# Patient Record
Sex: MALE | Race: WHITE | NOT HISPANIC OR LATINO | Employment: FULL TIME | URBAN - METROPOLITAN AREA
[De-identification: names, ages, dates, MRNs, and addresses within clinical notes are randomized per-mention and may not be internally consistent; named-entity substitution may affect disease eponyms.]

---

## 2018-10-09 ENCOUNTER — HOSPITAL ENCOUNTER (EMERGENCY)
Facility: CLINIC | Age: 23
Discharge: HOME | End: 2018-10-09
Attending: EMERGENCY MEDICINE | Admitting: EMERGENCY MEDICINE
Payer: COMMERCIAL

## 2018-10-09 VITALS
TEMPERATURE: 98 F | DIASTOLIC BLOOD PRESSURE: 68 MMHG | BODY MASS INDEX: 25.67 KG/M2 | WEIGHT: 200 LBS | HEIGHT: 74 IN | RESPIRATION RATE: 18 BRPM | SYSTOLIC BLOOD PRESSURE: 131 MMHG | HEART RATE: 89 BPM | OXYGEN SATURATION: 97 %

## 2018-10-09 DIAGNOSIS — R10.30 LOWER ABDOMINAL PAIN: ICD-10-CM

## 2018-10-09 LAB
ALBUMIN SERPL-MCNC: 3.3 G/DL (ref 3.4–5)
ALBUMIN UR-MCNC: NEGATIVE MG/DL
ALP SERPL-CCNC: 84 U/L (ref 40–150)
ALT SERPL W P-5'-P-CCNC: 59 U/L (ref 0–70)
ANION GAP SERPL CALCULATED.3IONS-SCNC: 6 MMOL/L (ref 3–14)
APPEARANCE UR: CLEAR
AST SERPL W P-5'-P-CCNC: 40 U/L (ref 0–45)
BASOPHILS # BLD AUTO: 0 10E9/L (ref 0–0.2)
BASOPHILS NFR BLD AUTO: 0.4 %
BILIRUB SERPL-MCNC: 0.3 MG/DL (ref 0.2–1.3)
BILIRUB UR QL STRIP: NEGATIVE
BUN SERPL-MCNC: 13 MG/DL (ref 7–30)
CALCIUM SERPL-MCNC: 8.5 MG/DL (ref 8.5–10.1)
CHLORIDE SERPL-SCNC: 96 MMOL/L (ref 94–109)
CO2 SERPL-SCNC: 32 MMOL/L (ref 20–32)
COLOR UR AUTO: YELLOW
CREAT SERPL-MCNC: 0.8 MG/DL (ref 0.66–1.25)
DIFFERENTIAL METHOD BLD: NORMAL
EOSINOPHIL # BLD AUTO: 0.2 10E9/L (ref 0–0.7)
EOSINOPHIL NFR BLD AUTO: 2.5 %
ERYTHROCYTE [DISTWIDTH] IN BLOOD BY AUTOMATED COUNT: 12.7 % (ref 10–15)
GFR SERPL CREATININE-BSD FRML MDRD: >90 ML/MIN/1.7M2
GLUCOSE BLDC GLUCOMTR-MCNC: 109 MG/DL (ref 70–99)
GLUCOSE SERPL-MCNC: 402 MG/DL (ref 70–99)
GLUCOSE UR STRIP-MCNC: NEGATIVE MG/DL
HBA1C MFR BLD: 5.4 % (ref 0–5.6)
HCT VFR BLD AUTO: 46 % (ref 40–53)
HGB BLD-MCNC: 15.3 G/DL (ref 13.3–17.7)
HGB UR QL STRIP: NEGATIVE
IMM GRANULOCYTES # BLD: 0 10E9/L (ref 0–0.4)
IMM GRANULOCYTES NFR BLD: 0.1 %
KETONES UR STRIP-MCNC: NEGATIVE MG/DL
LEUKOCYTE ESTERASE UR QL STRIP: NEGATIVE
LYMPHOCYTES # BLD AUTO: 3.1 10E9/L (ref 0.8–5.3)
LYMPHOCYTES NFR BLD AUTO: 38.3 %
MCH RBC QN AUTO: 28.5 PG (ref 26.5–33)
MCHC RBC AUTO-ENTMCNC: 33.3 G/DL (ref 31.5–36.5)
MCV RBC AUTO: 86 FL (ref 78–100)
MONOCYTES # BLD AUTO: 0.6 10E9/L (ref 0–1.3)
MONOCYTES NFR BLD AUTO: 7.1 %
NEUTROPHILS # BLD AUTO: 4.2 10E9/L (ref 1.6–8.3)
NEUTROPHILS NFR BLD AUTO: 51.6 %
NITRATE UR QL: NEGATIVE
NRBC # BLD AUTO: 0 10*3/UL
NRBC BLD AUTO-RTO: 0 /100
PH UR STRIP: 6 PH (ref 5–7)
PLATELET # BLD AUTO: 208 10E9/L (ref 150–450)
POTASSIUM SERPL-SCNC: 3.2 MMOL/L (ref 3.4–5.3)
PROT SERPL-MCNC: 7.2 G/DL (ref 6.8–8.8)
RBC # BLD AUTO: 5.37 10E12/L (ref 4.4–5.9)
SODIUM SERPL-SCNC: 134 MMOL/L (ref 133–144)
SOURCE: NORMAL
SP GR UR STRIP: 1.02 (ref 1–1.03)
UROBILINOGEN UR STRIP-MCNC: 0 MG/DL (ref 0–2)
WBC # BLD AUTO: 8.1 10E9/L (ref 4–11)

## 2018-10-09 PROCEDURE — 85025 COMPLETE CBC W/AUTO DIFF WBC: CPT | Performed by: EMERGENCY MEDICINE

## 2018-10-09 PROCEDURE — 80053 COMPREHEN METABOLIC PANEL: CPT | Performed by: EMERGENCY MEDICINE

## 2018-10-09 PROCEDURE — 99283 EMERGENCY DEPT VISIT LOW MDM: CPT | Performed by: EMERGENCY MEDICINE

## 2018-10-09 PROCEDURE — 81003 URINALYSIS AUTO W/O SCOPE: CPT | Performed by: EMERGENCY MEDICINE

## 2018-10-09 PROCEDURE — 00000146 ZZHCL STATISTIC GLUCOSE BY METER IP

## 2018-10-09 PROCEDURE — 83036 HEMOGLOBIN GLYCOSYLATED A1C: CPT | Performed by: EMERGENCY MEDICINE

## 2018-10-09 PROCEDURE — 99284 EMERGENCY DEPT VISIT MOD MDM: CPT | Mod: Z6 | Performed by: EMERGENCY MEDICINE

## 2018-10-09 NOTE — ED AVS SNAPSHOT
[facility] Emergency Department    [address] [address]    Phone:  [phone]    Fax:  [phone]                                       Filippo Solomon   MRN: [id number]    Department:  [facility] Emergency Department   Date of Visit:  10/9/2018           Patient Information     Date Of Birth          1995        Your diagnoses for this visit were:     Lower abdominal pain        You were seen by Yogesh Manjarrez MD.      Follow-up Information     Follow up with No Ref-Primary, Physician.        Discharge Instructions         * Abdominal Pain,Uncertain Cause [Male]  Based on your visit today, the exact cause of your abdominal (stomach) pain is not clear. Your exam and tests do not indicate a dangerous cause at this time. However, the signs of a serious problem may take more time to appear. Although your exam was reassuring today, sometimes early in the course of many conditions, exam and lab tests can appear normal. Therefore, it is important for you to watch for any new symptoms or worsening of your condition.  Causes:  It may not be obvious what caused your symptoms. Pay attention to things that do seem to make your symptoms worse or better and discuss this with your doctor when you follow up.  Diagnosis:  The evaluation of abdominal pain in the emergency department may only require an exam by the doctor or it may include blood, urine or imaging studies, depending on many factors. Sometimes exams and tests can identify a cause but in many cases, a clear cause is not found. Further testing at follow up visits may help to suggest a clear diagnosis.  Home Care:    Rest as much as possible until your next exam.    Try to avoid any medications (unless otherwise directed by your doctor), foods, activities, or other factors that you may have contributed to your symptoms.    Try to eat foods that you know that you have tolerated well in the past. Certain diets may be recommended for  some conditions that cause abdominal pain. However, since the cause of your symptoms may not be clear, discuss your diet more with your primary care provider or specialist for further recommendations.     Eating several small meals per day as opposed to 2 or 3 larger meals may help.    Monitor closely for anything that may make your symptoms worse or better. Pay close attention to symptoms below that may indicate worsening of your condition.  Follow Up And Precautions:  See your doctor or this facility as instructed (or sooner, if your symptoms are not improving). In some cases, you may need more testing.  Contact Your Doctor Or Seek Medical Attention  if any of the following occur:    Pain is becoming worse    You are unable to take your medications because of too much vomiting    Swelling of the abdomen    Fever of 101 F (38.3 C) or higher, or as directed by your health care provider    Blood in vomit or bowel movements (dark red or black color)    Jaundice (yellow color of eyes and skin)    New onset of weakness, dizziness or fainting    New onset of chest, arm, back, neck or jaw pain    9403-2760 [employer]. [address] [place] [address]. All rights reserved. This information is not intended as a substitute for professional medical care. Always follow your healthcare professional's instructions.  This information has been modified by your health care provider with permission from the publisher.          24 Hour Appointment Hotline       To make an appointment at any [facility] call [phone] ([phone]). If you don't have a family doctor or clinic, we will help you find one. [facility] clinics are conveniently located to serve the needs of you and your family.             Review of your medicines      Notice     You have not been prescribed any medications.            Procedures and tests performed during your visit     CBC with platelets differential    Comprehensive  metabolic panel    Glucose Monitoring Nursing    Glucose by meter    Hemaglobin A1C    UA reflex to Microscopic      Orders Needing Specimen Collection     None      Pending Results     No orders found from 10/7/2018 to 10/10/2018.            Pending Culture Results     No orders found from 10/7/2018 to 10/10/2018.            Pending Results Instructions     If you had any lab results that were not finalized at the time of your Discharge, you can call the ED Lab Result RN at [phone]. You will be contacted by this team for any positive Lab results or changes in treatment. The nurses are available 7 days a week from 10A to 6:30P.  You can leave a message 24 hours per day and they will return your call.        Test Results From Your Hospital Stay        10/9/2018  8:55 PM      Component Results     Component Value Ref Range & Units Status    WBC 8.1 4.0 - 11.0 10e9/L Final    RBC Count 5.37 4.4 - 5.9 10e12/L Final    Hemoglobin 15.3 13.3 - 17.7 g/dL Final    Hematocrit 46.0 40.0 - 53.0 % Final    MCV 86 78 - 100 fl Final    MCH 28.5 26.5 - 33.0 pg Final    MCHC 33.3 31.5 - 36.5 g/dL Final    RDW 12.7 10.0 - 15.0 % Final    Platelet Count 208 150 - 450 10e9/L Final    Diff Method Automated Method  Final    % Neutrophils 51.6 % Final    % Lymphocytes 38.3 % Final    % Monocytes 7.1 % Final    % Eosinophils 2.5 % Final    % Basophils 0.4 % Final    % Immature Granulocytes 0.1 % Final    Nucleated RBCs 0 0 /100 Final    Absolute Neutrophil 4.2 1.6 - 8.3 10e9/L Final    Absolute Lymphocytes 3.1 0.8 - 5.3 10e9/L Final    Absolute Monocytes 0.6 0.0 - 1.3 10e9/L Final    Absolute Eosinophils 0.2 0.0 - 0.7 10e9/L Final    Absolute Basophils 0.0 0.0 - 0.2 10e9/L Final    Abs Immature Granulocytes 0.0 0 - 0.4 10e9/L Final    Absolute Nucleated RBC 0.0  Final         10/9/2018  9:26 PM      Component Results     Component Value Ref Range & Units Status    Sodium 134 133 - 144 mmol/L Final    Potassium 3.2 (L) 3.4 - 5.3 mmol/L  Final    Chloride 96 94 - 109 mmol/L Final    Carbon Dioxide 32 20 - 32 mmol/L Final    Anion Gap 6 3 - 14 mmol/L Final    Glucose 402 (H) 70 - 99 mg/dL Final    Urea Nitrogen 13 7 - 30 mg/dL Final    Creatinine 0.80 0.66 - 1.25 mg/dL Final    GFR Estimate >90 >60 mL/min/1.7m2 Final    Non  GFR Calc    GFR Estimate If Black >90 >60 mL/min/1.7m2 Final    African American GFR Calc    Calcium 8.5 8.5 - 10.1 mg/dL Final    Bilirubin Total 0.3 0.2 - 1.3 mg/dL Final    Albumin 3.3 (L) 3.4 - 5.0 g/dL Final    Protein Total 7.2 6.8 - 8.8 g/dL Final    Alkaline Phosphatase 84 40 - 150 U/L Final    ALT 59 0 - 70 U/L Final    AST 40 0 - 45 U/L Final         10/9/2018  8:59 PM      Component Results     Component Value Ref Range & Units Status    Color Urine Yellow  Final    Appearance Urine Clear  Final    Glucose Urine Negative NEG^Negative mg/dL Final    Bilirubin Urine Negative NEG^Negative Final    Ketones Urine Negative NEG^Negative mg/dL Final    Specific Gravity Urine 1.023 1.003 - 1.035 Final    Blood Urine Negative NEG^Negative Final    pH Urine 6.0 5.0 - 7.0 pH Final    Protein Albumin Urine Negative NEG^Negative mg/dL Final    Urobilinogen mg/dL 0.0 0.0 - 2.0 mg/dL Final    Nitrite Urine Negative NEG^Negative Final    Leukocyte Esterase Urine Negative NEG^Negative Final    Source Midstream Urine  Final         10/9/2018 10:16 PM      Component Results     Component Value Ref Range & Units Status    Hemoglobin A1C 5.4 0 - 5.6 % Final    Normal <5.7% Prediabetes 5.7-6.4%  Diabetes 6.5% or higher - adopted from ADA   consensus guidelines.           10/9/2018  9:50 PM      Component Results     Component Value Ref Range & Units Status    Glucose 109 (H) 70 - 99 mg/dL Final                Thank you for choosing [facility]       Thank you for choosing [facility] for your care. Our goal is always to provide you with excellent care. Hearing back from our patients is one way we can continue to improve our  "services. Please take a few minutes to complete the written survey that you may receive in the mail after you visit with us. Thank you!        [facility] medical Information     [employer] lets you send messages to your doctor, view your test results, renew your prescriptions, schedule appointments and more. To sign up, go to [email or web address] . Click on \"Log in\" on the left side of the screen, which will take you to the Welcome page. Then click on \"Sign up Now\" on the right side of the page.     You will be asked to enter the access code listed below, as well as some personal information. Please follow the directions to create your username and password.     Your access code is: [email or web address]  Expires: 2019 10:23 PM     Your access code will  in 90 days. If you need help or a new code, please call your [facility] clinic or [phone].        Care EveryWhere ID     This is your Care EveryWhere ID. This could be used by other organizations to access your [facility] medical records  [id number]        Equal Access to Services     [facility] HospitalVERONICA : Hadii cole Mancuso, waasherda jeferson, qaybmartin hamilton, robert camilo. So [facility] [phone].    ATENCIÓN: Si habla español, tiene a stubbs disposición servicios gratuitos de asistencia lingüística. Llame al [phone].    We comply with applicable federal civil rights laws and [place] laws. We do not discriminate on the basis of race, color, national origin, age, disability, sex, sexual orientation, or gender identity.            After Visit Summary       This is your record. Keep this with you and show to your community pharmacist(s) and doctor(s) at your next visit.                  "

## 2018-10-09 NOTE — ED AVS SNAPSHOT
[facility] Emergency Department    [address] [facility] [address]    Phone:  [phone]    Fax:  [phone]                                       Filippo Solomon   MRN: [id number]    Department:  [facility] Emergency Department   Date of Visit:  10/9/2018           After Visit Summary Signature Page     I have received my discharge instructions, and my questions have been answered. I have discussed any challenges I see with this plan with the nurse or doctor.    ..........................................................................................................................................  Patient/Patient Representative Signature      ..........................................................................................................................................  Patient Representative Print Name and Relationship to Patient    ..................................................               ................................................  Date                                   Time    ..........................................................................................................................................  Reviewed by Signature/Title    ...................................................              ..............................................  Date                                               Time          22EPIC Rev 08/18

## 2018-10-10 NOTE — DISCHARGE INSTRUCTIONS
* Abdominal Pain,Uncertain Cause [Male]  Based on your visit today, the exact cause of your abdominal (stomach) pain is not clear. Your exam and tests do not indicate a dangerous cause at this time. However, the signs of a serious problem may take more time to appear. Although your exam was reassuring today, sometimes early in the course of many conditions, exam and lab tests can appear normal. Therefore, it is important for you to watch for any new symptoms or worsening of your condition.  Causes:  It may not be obvious what caused your symptoms. Pay attention to things that do seem to make your symptoms worse or better and discuss this with your doctor when you follow up.  Diagnosis:  The evaluation of abdominal pain in the emergency department may only require an exam by the doctor or it may include blood, urine or imaging studies, depending on many factors. Sometimes exams and tests can identify a cause but in many cases, a clear cause is not found. Further testing at follow up visits may help to suggest a clear diagnosis.  Home Care:    Rest as much as possible until your next exam.    Try to avoid any medications (unless otherwise directed by your doctor), foods, activities, or other factors that you may have contributed to your symptoms.    Try to eat foods that you know that you have tolerated well in the past. Certain diets may be recommended for some conditions that cause abdominal pain. However, since the cause of your symptoms may not be clear, discuss your diet more with your primary care provider or specialist for further recommendations.     Eating several small meals per day as opposed to 2 or 3 larger meals may help.    Monitor closely for anything that may make your symptoms worse or better. Pay close attention to symptoms below that may indicate worsening of your condition.  Follow Up And Precautions:  See your doctor or this facility as instructed (or sooner, if your symptoms are not  improving). In some cases, you may need more testing.  Contact Your Doctor Or Seek Medical Attention  if any of the following occur:    Pain is becoming worse    You are unable to take your medications because of too much vomiting    Swelling of the abdomen    Fever of 101 F (38.3 C) or higher, or as directed by your health care provider    Blood in vomit or bowel movements (dark red or black color)    Jaundice (yellow color of eyes and skin)    New onset of weakness, dizziness or fainting    New onset of chest, arm, back, neck or jaw pain    3895-9078 [employer]. [address] [place] [address]. All rights reserved. This information is not intended as a substitute for professional medical care. Always follow your healthcare professional's instructions.  This information has been modified by your health care provider with permission from the publisher.

## 2018-10-10 NOTE — ED NOTES
Pt presented accompanied by his fiance, c/o swelling in R testicle.     Pt stated he had felt chills last night and was concerned about epididymitis after researching symptoms online.     Pt was A/O x4 on arrival, skin was normal/ warm/ dry, radial pulse was strong/ reg, respirations were non-labored.     Pt denied DYSURIA/ HEMATURIA, denied painful intercourse.

## 2018-10-10 NOTE — ED PROVIDER NOTES
"  History     Chief Complaint   Patient presents with     Groin Swelling     started last noc with groining pain, and low abd pain x 3 days, thought he had a UTI      HPI  Filippo Solomon is a 23 year old male who presents complaining of lower abdominal pain that began about a week ago without inciting trauma.  Described as a bloating sensation mid lower abdomen waxing and waning.  Last night he developed right testicular pain and tenderness lasting about 30 minutes, he had some associated chills.  He said urinary frequency without dysuria or gross hematuria.  He denies any penile discharge.  He has had no nausea or vomiting.  He had 2-3 episodes of watery diarrhea earlier today without bloody component.  He denies ill contacts.  No chronic medications, is non-smoker, occasional alcohol denies illicit drug use.  His father's had kidney stone.  No prior abdominal surgery.    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  He is strapped into a cage with multiple seatbelt harness is, and believes this may be why he is having some lower abdominal discomfort.    Problem List:    There are no active problems to display for this patient.       Past Medical History:    No past medical history on file.    Past Surgical History:    No past surgical history on file.    Family History:    No family history on file.    Social History:  Marital Status:  Single [1]  Social History   Substance Use Topics     Smoking status: Not on file     Smokeless tobacco: Not on file     Alcohol use Not on file        Medications:      No current outpatient prescriptions on file.      Review of Systems  All other systems reviewed and are negative.    Physical Exam   BP: 148/84  Pulse: 89  Heart Rate: 88  Temp: 98  F (36.7  C)  Resp: 16  Height: 188 cm (6' 2\")  Weight: " 90.7 kg (200 lb)  SpO2: 100 %      Physical Exam  Nontoxic appearing no respiratory distress alert and oriented ×3  Head atraumatic normocephalic  Conjunctiva noninjected, oropharynx moist without lesions or erythema  No cervical adenopathy   Neck supple full active painless range of motion  Lungs clear to auscultation  Heart regular no murmur  Abdomen soft nontender bowel sounds positive no masses or HSM  External genitalia normally developed, testes descended bilaterally, cords and testes are unremarkable and nontender, there is no associated swelling, no testicular mass appreciated.  He has a small indirect right inguinal canal hernia.  Strength and sensation grossly intact throughout the extremities, gait and station normal  Speech is fluent, good eye contact, thought processes are rational      ED Course     ED Course     Procedures               Critical Care time:  none     Results for orders placed or performed during the hospital encounter of 10/09/18   CBC with platelets differential   Result Value Ref Range    WBC 8.1 4.0 - 11.0 10e9/L    RBC Count 5.37 4.4 - 5.9 10e12/L    Hemoglobin 15.3 13.3 - 17.7 g/dL    Hematocrit 46.0 40.0 - 53.0 %    MCV 86 78 - 100 fl    MCH 28.5 26.5 - 33.0 pg    MCHC 33.3 31.5 - 36.5 g/dL    RDW 12.7 10.0 - 15.0 %    Platelet Count 208 150 - 450 10e9/L    Diff Method Automated Method     % Neutrophils 51.6 %    % Lymphocytes 38.3 %    % Monocytes 7.1 %    % Eosinophils 2.5 %    % Basophils 0.4 %    % Immature Granulocytes 0.1 %    Nucleated RBCs 0 0 /100    Absolute Neutrophil 4.2 1.6 - 8.3 10e9/L    Absolute Lymphocytes 3.1 0.8 - 5.3 10e9/L    Absolute Monocytes 0.6 0.0 - 1.3 10e9/L    Absolute Eosinophils 0.2 0.0 - 0.7 10e9/L    Absolute Basophils 0.0 0.0 - 0.2 10e9/L    Abs Immature Granulocytes 0.0 0 - 0.4 10e9/L    Absolute Nucleated RBC 0.0    Comprehensive metabolic panel   Result Value Ref Range    Sodium 134 133 - 144 mmol/L    Potassium 3.2 (L) 3.4 - 5.3 mmol/L     Chloride 96 94 - 109 mmol/L    Carbon Dioxide 32 20 - 32 mmol/L    Anion Gap 6 3 - 14 mmol/L    Glucose 402 (H) 70 - 99 mg/dL    Urea Nitrogen 13 7 - 30 mg/dL    Creatinine 0.80 0.66 - 1.25 mg/dL    GFR Estimate >90 >60 mL/min/1.7m2    GFR Estimate If Black >90 >60 mL/min/1.7m2    Calcium 8.5 8.5 - 10.1 mg/dL    Bilirubin Total 0.3 0.2 - 1.3 mg/dL    Albumin 3.3 (L) 3.4 - 5.0 g/dL    Protein Total 7.2 6.8 - 8.8 g/dL    Alkaline Phosphatase 84 40 - 150 U/L    ALT 59 0 - 70 U/L    AST 40 0 - 45 U/L   UA reflex to Microscopic   Result Value Ref Range    Color Urine Yellow     Appearance Urine Clear     Glucose Urine Negative NEG^Negative mg/dL    Bilirubin Urine Negative NEG^Negative    Ketones Urine Negative NEG^Negative mg/dL    Specific Gravity Urine 1.023 1.003 - 1.035    Blood Urine Negative NEG^Negative    pH Urine 6.0 5.0 - 7.0 pH    Protein Albumin Urine Negative NEG^Negative mg/dL    Urobilinogen mg/dL 0.0 0.0 - 2.0 mg/dL    Nitrite Urine Negative NEG^Negative    Leukocyte Esterase Urine Negative NEG^Negative    Source Midstream Urine    Hemaglobin A1C   Result Value Ref Range    Hemoglobin A1C 5.4 0 - 5.6 %   Glucose by meter   Result Value Ref Range    Glucose 109 (H) 70 - 99 mg/dL                 No results found for this or any previous visit (from the past 24 hour(s)).    Medications - No data to display    Assessments & Plan (with Medical Decision Making)  Midline lower abdominal pain, exam unremarkable, workup significant only for confounding aberrant serum blood sugar of 400, hemoglobin A1c 5.4 and bedside blood sugar 109, urine negative for glucosuria.  Findings likely secondary to lab error.  With respect to abdominal pain, no evidence for usual differential.  May well be abdominal wall strain secondary to drag racing.  Recommend watchful waiting.  Sabas return criteria, he and his wife expressed understanding and agreement.     I have reviewed the nursing notes.    I have reviewed the findings,  diagnosis, plan and need for follow up with the patient.       There are no discharge medications for this patient.      Final diagnoses:   Lower abdominal pain       10/9/2018   [facility] EMERGENCY DEPARTMENT     Yogesh Manjarrez MD  10/12/18 3953

## 2019-10-07 ENCOUNTER — OFFICE VISIT (OUTPATIENT)
Dept: FAMILY MEDICINE | Facility: CLINIC | Age: 24
End: 2019-10-07
Payer: COMMERCIAL

## 2019-10-07 VITALS
HEART RATE: 80 BPM | RESPIRATION RATE: 14 BRPM | HEIGHT: 73 IN | BODY MASS INDEX: 24.92 KG/M2 | SYSTOLIC BLOOD PRESSURE: 124 MMHG | DIASTOLIC BLOOD PRESSURE: 78 MMHG | TEMPERATURE: 97.8 F | WEIGHT: 188 LBS | OXYGEN SATURATION: 99 %

## 2019-10-07 DIAGNOSIS — Z28.21 VACCINATION REFUSED BY PATIENT: ICD-10-CM

## 2019-10-07 DIAGNOSIS — Z00.00 ROUTINE GENERAL MEDICAL EXAMINATION AT A HEALTH CARE FACILITY: Primary | ICD-10-CM

## 2019-10-07 DIAGNOSIS — Z23 NEED FOR VACCINATION: ICD-10-CM

## 2019-10-07 PROCEDURE — 99385 PREV VISIT NEW AGE 18-39: CPT | Mod: 25 | Performed by: FAMILY MEDICINE

## 2019-10-07 PROCEDURE — 90471 IMMUNIZATION ADMIN: CPT | Performed by: FAMILY MEDICINE

## 2019-10-07 PROCEDURE — 90714 TD VACC NO PRESV 7 YRS+ IM: CPT | Performed by: FAMILY MEDICINE

## 2019-10-07 ASSESSMENT — MIFFLIN-ST. JEOR: SCORE: 1887.67

## 2019-10-07 NOTE — PROGRESS NOTES
SUBJECTIVE:   CC: Filippo Solomon is an 24 year old male who presents for preventive health visit.     Healthy Habits:    Do you get at least three servings of calcium containing foods daily (dairy, green leafy vegetables, etc.)? yes    Amount of exercise or daily activities, outside of work: pt is active - jetskiing, snowmobiling, racing, yardwork.    Problems taking medications regularly Na    Medication side effects: Na    Have you had an eye exam in the past two years? yes    Do you see a dentist twice per year? No, appt next wk    Do you have sleep apnea, excessive snoring or daytime drowsiness? no      No other concerns    Today's PHQ-2 Score:   PHQ-2 ( 1999 [employer]) 10/7/2019   Q1: Little interest or pleasure in doing things 0   Q2: Feeling down, depressed or hopeless 0   PHQ-2 Score 0       Abuse: Current or Past(Physical, Sexual or Emotional)- No  Do you feel safe in your environment? Yes    Social History     Tobacco Use     Smoking status: Never Smoker     Smokeless tobacco: Never Used   Substance Use Topics     Alcohol use: Yes     Comment: social     If you drink alcohol do you typically have >3 drinks per day or >7 drinks per week? No                      Last PSA: No results found for: PSA    Reviewed orders with patient. Reviewed health maintenance and updated orders accordingly - Yes  There is no problem list on file for this patient.    History reviewed. No pertinent surgical history.    Social History     Tobacco Use     Smoking status: Never Smoker     Smokeless tobacco: Never Used   Substance Use Topics     Alcohol use: Yes     Comment: social     Family History   Problem Relation Age of Onset     Hypertension Mother      Diabetes Maternal Grandmother      Breast Cancer Maternal Grandmother      Kidney failure Paternal Grandfather      Breast Cancer Maternal Aunt          No current outpatient medications on file.     No Known Allergies    Reviewed and updated as needed this visit by clinical  "staff  Tobacco  Allergies  Meds  Med Hx  Surg Hx  Fam Hx  Soc Hx        Reviewed and updated as needed this visit by Provider        History reviewed. No pertinent past medical history.   History reviewed. No pertinent surgical history.    ROS:  C: NEGATIVE for fever, chills, or change in weight  I: NEGATIVE for worrisome rashes, moles or lesions  E: NEGATIVE for vision changes or irritation  ENT: NEGATIVE for ear, mouth and throat problems  R: NEGATIVE for significant cough or SOB  CV: NEGATIVE for chest pain, palpitations or peripheral edema  GI: NEGATIVE for nausea, abdominal pain, heartburn, or change in bowel habits  :negative for dysuria, hematuria, decreased urinary stream, or discharge  M: NEGATIVE for significant arthralgias or myalgia  N: NEGATIVE for weakness, dizziness or paresthesias  E: NEGATIVE for temperature intolerance, skin/hair changes  H: NEGATIVE for bleeding problems  P: NEGATIVE for changes in mood or affect    OBJECTIVE:   /78   Pulse 80   Temp 97.8  F (36.6  C) (Tympanic)   Resp 14   Ht 1.848 m (6' 0.75\")   Wt 85.3 kg (188 lb)   SpO2 99%   BMI 24.97 kg/m    EXAM:  GENERAL APPEARANCE: healthy, alert and no distress  EYES: pink conj, no icterus, PERRL, EOMI  HENT: ear canals and TM's normal, nose and mouth without ulcers or lesions, oropharynx clear and oral mucous membranes moist  NECK: no adenopathy, no asymmetry, masses, or scars and thyroid normal to palpation  RESP: lungs clear to auscultation - no rales, rhonchi or wheezes  CV: regular rates and rhythm, normal S1 S2, no S3 or S4, no murmur, click or rub, no peripheral edema and peripheral pulses strong  ABDOMEN: soft, nontender, no hepatosplenomegaly, no masses and bowel sounds normal  RECTAL: deferred  MS: no musculoskeletal defects are noted and gait is age appropriate without ataxia  SKIN: no suspicious lesions or rashes  NEURO: Normal strength and tone, sensory exam grossly normal, mentation intact and speech " "normal  PSYCH: appears anxious (he states he does not got o doctors a lot), appropriate affect    Diagnostic Test Results:  none     ASSESSMENT/PLAN:   Filippo was seen today for physical.    Diagnoses and all orders for this visit:    Routine general medical examination at a health care facility    Vaccination refused by patient  Offered flu vaccine.  Discussed benefits of getting it and risk of not getting it.  Patient still declined.    Need for vaccination  -     TD PRSERV FREE >=7 YRS ADS IM [21377]  -     1st  Administration  [[id number]]        COUNSELING:  Reviewed preventive health counseling, as reflected in patient instructions       Regular exercise       Healthy diet/nutrition       Vision screening       Hearing screening       Immunizations    Vaccinated for: Td and Declined: Influenza due to patient states he is deathly afraid of needles               Alcohol Use       Family planning       Safe sex practices/STD prevention       HIV screeninx in teen years, 1x in adult years, and at intervals if high risk       Osteoporosis Prevention/Bone Health    Estimated body mass index is 24.97 kg/m  as calculated from the following:    Height as of this encounter: 1.848 m (6' 0.75\").    Weight as of this encounter: 85.3 kg (188 lb).         reports that he has never smoked. He has never used smokeless tobacco.      Counseling Resources:  ATP IV Guidelines  Pooled Cohorts Equation Calculator  FRAX Risk Assessment  ICSI Preventive Guidelines  Dietary Guidelines for Americans, 2010  USDA's MyPlate  ASA Prophylaxis  Lung CA Screening    Yogesh Manzo MD  [facility]  "

## 2019-10-07 NOTE — PATIENT INSTRUCTIONS
Think about a flu shot this season.      Preventive Health Recommendations  Male Ages 21 - 25     Yearly exam:             See your health care provider every year in order to  o   Review health changes.   o   Discuss preventive care.    o   Review your medicines if your doctor has prescribed any.    You should be tested each year for STDs (sexually transmitted diseases).     Talk to your provider about cholesterol testing.      If you are at risk for diabetes, you should have a diabetes test (fasting glucose).    Shots: Get a flu shot each year. Get a tetanus shot every 10 years.     Nutrition:    Eat at least 5 servings of fruits and vegetables daily.     Eat whole-grain bread, whole-wheat pasta and brown rice instead of white grains and rice.     Get adequate calcium and Vitamin D.     Lifestyle    Exercise for at least 150 minutes a week (30 minutes a day, 5 days a week). This will help you control your weight and prevent disease.     Limit alcohol to one drink per day.     No smoking.     Wear sunscreen to prevent skin cancer.     See your dentist every six months for an exam and cleaning.

## 2022-04-06 ENCOUNTER — TRANSCRIBE ORDERS (OUTPATIENT)
Dept: OTHER | Age: 27
End: 2022-04-06

## 2022-04-06 DIAGNOSIS — S83.212D BUCKET-HANDLE TEAR OF MEDIAL MENISCUS OF LEFT KNEE AS CURRENT INJURY, SUBSEQUENT ENCOUNTER: Primary | ICD-10-CM

## 2022-04-26 ENCOUNTER — HOSPITAL ENCOUNTER (OUTPATIENT)
Dept: PHYSICAL THERAPY | Facility: CLINIC | Age: 27
Setting detail: THERAPIES SERIES
Discharge: HOME | End: 2022-04-26
Attending: ORTHOPAEDIC SURGERY
Payer: COMMERCIAL

## 2022-04-26 DIAGNOSIS — S83.212D BUCKET-HANDLE TEAR OF MEDIAL MENISCUS OF LEFT KNEE AS CURRENT INJURY, SUBSEQUENT ENCOUNTER: ICD-10-CM

## 2022-04-26 PROCEDURE — 97161 PT EVAL LOW COMPLEX 20 MIN: CPT | Mod: GP | Performed by: PHYSICAL THERAPIST

## 2022-04-26 PROCEDURE — 97110 THERAPEUTIC EXERCISES: CPT | Mod: GP | Performed by: PHYSICAL THERAPIST
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
## 2022-05-03 ENCOUNTER — HOSPITAL ENCOUNTER (OUTPATIENT)
Dept: PHYSICAL THERAPY | Facility: CLINIC | Age: 27
Setting detail: THERAPIES SERIES
Discharge: HOME | End: 2022-05-03
Attending: ORTHOPAEDIC SURGERY
Payer: COMMERCIAL

## 2022-05-03 PROCEDURE — 97110 THERAPEUTIC EXERCISES: CPT | Mod: GP | Performed by: PHYSICAL THERAPIST

## 2022-05-09 ENCOUNTER — HOSPITAL ENCOUNTER (OUTPATIENT)
Dept: PHYSICAL THERAPY | Facility: CLINIC | Age: 27
Setting detail: THERAPIES SERIES
Discharge: HOME | End: 2022-05-09
Attending: ORTHOPAEDIC SURGERY
Payer: COMMERCIAL

## 2022-05-09 PROCEDURE — 97110 THERAPEUTIC EXERCISES: CPT | Mod: GP | Performed by: PHYSICAL THERAPIST

## 2022-05-16 ENCOUNTER — HOSPITAL ENCOUNTER (EMERGENCY)
Facility: CLINIC | Age: 27
Discharge: HOME | End: 2022-05-16
Attending: FAMILY MEDICINE | Admitting: FAMILY MEDICINE
Payer: COMMERCIAL

## 2022-05-16 VITALS
BODY MASS INDEX: 23.74 KG/M2 | OXYGEN SATURATION: 97 % | HEIGHT: 74 IN | TEMPERATURE: 97.9 F | SYSTOLIC BLOOD PRESSURE: 118 MMHG | DIASTOLIC BLOOD PRESSURE: 77 MMHG | WEIGHT: 185 LBS | RESPIRATION RATE: 18 BRPM | HEART RATE: 96 BPM

## 2022-05-16 DIAGNOSIS — I89.1 LYMPHANGITIS: ICD-10-CM

## 2022-05-16 DIAGNOSIS — L03.113 CELLULITIS OF RIGHT UPPER EXTREMITY: ICD-10-CM

## 2022-05-16 PROCEDURE — 99282 EMERGENCY DEPT VISIT SF MDM: CPT | Performed by: FAMILY MEDICINE

## 2022-05-16 PROCEDURE — 99282 EMERGENCY DEPT VISIT SF MDM: CPT

## 2022-05-17 NOTE — DISCHARGE INSTRUCTIONS
RETURN TO THE EMERGENCY ROOM FOR THE FOLLOWING:    Severely worsened pain/redness/swelling/tenderness that is extending above the forearm and into the upper arm, fever greater than 101 with vomiting or lightheadedness and fainting, or at anytime for any concern.    FOLLOW UP:    Return to the ER at anytime for any concern.  If you are not improving by 3 days of treatment let us take another look at you.    TREATMENT RECOMMENDATIONS:    None new.  No changes.    NURSE ADVICE LINE:  [phone] or [phone]

## 2022-05-31 ENCOUNTER — HOSPITAL ENCOUNTER (OUTPATIENT)
Dept: PHYSICAL THERAPY | Facility: CLINIC | Age: 27
Setting detail: THERAPIES SERIES
Discharge: HOME | End: 2022-05-31
Attending: ORTHOPAEDIC SURGERY
Payer: COMMERCIAL

## 2022-05-31 PROCEDURE — 97110 THERAPEUTIC EXERCISES: CPT | Mod: GP | Performed by: PHYSICAL THERAPIST

## 2022-06-07 ENCOUNTER — HOSPITAL ENCOUNTER (OUTPATIENT)
Dept: PHYSICAL THERAPY | Facility: CLINIC | Age: 27
Setting detail: THERAPIES SERIES
Discharge: HOME | End: 2022-06-07
Attending: ORTHOPAEDIC SURGERY
Payer: COMMERCIAL

## 2022-06-07 PROCEDURE — 97110 THERAPEUTIC EXERCISES: CPT | Mod: GP | Performed by: PHYSICAL THERAPIST

## 2022-06-27 ENCOUNTER — HOSPITAL ENCOUNTER (OUTPATIENT)
Dept: PHYSICAL THERAPY | Facility: CLINIC | Age: 27
Setting detail: THERAPIES SERIES
Discharge: HOME | End: 2022-06-27
Attending: ORTHOPAEDIC SURGERY
Payer: COMMERCIAL

## 2022-06-27 PROCEDURE — 97110 THERAPEUTIC EXERCISES: CPT | Mod: GP | Performed by: PHYSICAL THERAPIST

## 2022-07-10 ENCOUNTER — HEALTH MAINTENANCE LETTER (OUTPATIENT)
Age: 27
End: 2022-07-10

## 2022-07-18 ENCOUNTER — HOSPITAL ENCOUNTER (OUTPATIENT)
Dept: PHYSICAL THERAPY | Facility: CLINIC | Age: 27
Setting detail: THERAPIES SERIES
Discharge: HOME | End: 2022-07-18
Attending: ORTHOPAEDIC SURGERY
Payer: COMMERCIAL

## 2022-07-18 PROCEDURE — 97110 THERAPEUTIC EXERCISES: CPT | Mod: GP | Performed by: PHYSICAL THERAPIST

## 2022-08-16 ENCOUNTER — HOSPITAL ENCOUNTER (OUTPATIENT)
Dept: PHYSICAL THERAPY | Facility: CLINIC | Age: 27
Setting detail: THERAPIES SERIES
Discharge: HOME | End: 2022-08-16
Attending: ORTHOPAEDIC SURGERY
Payer: COMMERCIAL

## 2022-08-16 PROCEDURE — 97110 THERAPEUTIC EXERCISES: CPT | Mod: GP | Performed by: PHYSICAL THERAPIST

## 2022-09-11 ENCOUNTER — HEALTH MAINTENANCE LETTER (OUTPATIENT)
Age: 27
End: 2022-09-11

## 2022-10-26 NOTE — PROGRESS NOTES
"   OUTPATIENT PHYSICAL THERAPY DISCHARGE SUMMARY    Dr. Paco Arevalo    08/16/22 1100   Signing Clinician's Name / Credentials   Signing clinician's name / credentials Angeles Enriquez, PT   Session Number   Session Number 8   Adult Goals   PT Ortho Eval Goals 1;2;3;4;5   Ortho Goal 1   Goal Identifier 1.   Goal Description Pt will be able to walk w/o device w/slight to no limp   Goal Progress 6/7/22 mild limp   Target Date 05/31/22   Date Met 06/27/22   Ortho Goal 2   Goal Identifier 2.   Goal Description Pt will be able to do stairs reciprocal w/ minimal to no difficulty   Goal Progress 6/27/22 no complaints   Target Date 07/05/22   Date Met 06/27/22   Ortho Goal 3   Goal Identifier 3.   Goal Description Pt will be able to squat for ADL's w/o difficulty   Goal Progress 7/18/22 limited squatting but notes he does not pay that much attention to it.   8/16/22 no restrictions w/ ADL's   Target Date 07/05/22   Date Met 08/16/22   Ortho Goal 4   Goal Identifier 4.   Goal Description Pt will be able to walk on uneven ground w/ minimal to no difficulty   Goal Progress 6/27/22 notes he is cautious.  7/18/22 no complaints--cautious but no complaints   Target Date 07/05/22   Date Met 07/18/22   Ortho Goal 5   Goal Identifier 5.   Goal Description Pt will be independent and consistent w/ HEP to manage symptoms   Target Date 07/05/22   Date Met 07/18/22   Subjective Report   Subjective Report Pt notes 1 day a couple weeks ago he had sharp pain which affected his walking.  [facility]  in backpack.  Pt states his knee did fine.  No pain currently.     Objective Measures    No limp    AROM L * (R 135*). KE lacks 4*   after ex lacked 2*/  Overpressure to 0*    hip ABD R 5/5, L 4+/5   Treatment Interventions   Therapeutic Procedure/exercise   Treatment Detail Reemphasized importance of follow thru.  incline board gastroc stretch 30\".  Backward walking.  Prone KE " "stretch 3# X 1 min  X 3 reps (did massage stick to HS / gastroc while doing prone leg hang.  SLS throwing 2# ball x 15..    7\" lateral step w/ 1 hand support X 20 .  Partial squat X 15 w/ mirror for feedback.   Furniture slide back and back diagonal.  L side plank w/ knees bent   Plan   Home program ex as above   Plan Pt to cont on own w/HEP.  Discharge from physical therapy.   Comments   Comments Pt met goals     "

## 2023-07-29 ENCOUNTER — HEALTH MAINTENANCE LETTER (OUTPATIENT)
Age: 28
End: 2023-07-29

## 2024-09-15 ENCOUNTER — HEALTH MAINTENANCE LETTER (OUTPATIENT)
Age: 29
End: 2024-09-15